# Patient Record
(demographics unavailable — no encounter records)

---

## 2020-04-09 NOTE — NUR
Patient resting in bed at this time, Dr Foster here to see patient and stated he is currently 
to sleepy to discuss information at this time. Patient received Ativan prior to arrival to 
unit.

 Per ED patient was urinating in sink, confused and anxious. Per MELODY Jordan during arrival to 
unit, Patient was cooperative with care and able to communicate nurses answers 
appropriately.

## 2020-04-09 NOTE — RAD
CHEST AP ONLY

 

Clinical indications: Shortness of breath. 

 

COMPARISON: February 29, 2016.

 

Findings: No acute lung infiltrate or pleural effusion or pulmonary edema 

or lung mass or pneumothorax is seen. Hiatal hernia is again evident. The 

heart size, pulmonary vasculature, mediastinum and both luisito are stable 

otherwise given rotation. 

 

Impression: No acute radiographic abnormality is seen.

 

Electronically signed by: Jose D Hernandez MD (4/9/2020 9:51 AM) [id number]

## 2020-04-09 NOTE — NUR
The patient, GURU PUTNAM, 72 y/o, M admitted by MANI BUSBY MD, was given written 
information regarding hospital policies, unit procedures and contact persons.  



Valuables were checked and left in room. Patient oriented to unit and call light system. Dr busby notified of arrival and of CTA. Remains on Fluid bolus on arrival to unit per sepsis 
protocol. Will obtain vitals per protocol

## 2020-04-09 NOTE — PHYS DOC
Past History


Past Medical History:  Asthma, GERD, Other


Additional Past Medical Histor:  LOW TESTERONE


Past Surgical History:  Other


Additional Past Surgical Histo:  WRIST SURGERY


Alcohol Use:  None





General Adult


EDM:


Chief Complaint:  FEVER





HPI:


HPI:


71-year-old male presents with fever.  The patient had a prostate biopsy as an 

outpatient yesterday.  About midnight, the patient woke up with chills and 

thought he might of a fever.  He measured a fever of 101.  He did not take any 

antipyretics.  When he woke up this morning, he still was feeling chilled and 

had a runny nose.  He is breathing faster than normal, but denies shortness of 

breath.  He does not have a cough.  His temperature on arrival was 99.  The 

patient did take prophylactic Levaquin before and after the procedure.  He 

denies any significant rectal pain, discharge or bleeding.  He has no known 

COVID 19 exposures or risk factors.  He has been out of the house, going to the 

store a few times.





Review of Systems:


Review of Systems:


Constitutional:  Fever and chills


Eyes:  Denies change in visual acuity 


HENT:  Runny nose


Respiratory:  Denies cough or shortness of breath 


Cardiovascular:  Denies chest pain or edema 


GI:  Nausea. Denies abdominal pain, vomiting, bloody stools or diarrhea 


:  Denies dysuria 


Musculoskeletal:  Denies back pain or joint pain 


Integument:  Denies rash 


Neurologic:  Denies headache, focal weakness or sensory changes 


Endocrine:  Denies polyuria or polydipsia 


Lymphatic:  Denies swollen glands 


Psychiatric:  Denies depression or anxiety





Heart Score:


Risk Factors:


Risk Factors:  DM, Current or recent (<one month) smoker, HTN, HLP, family 

history of CAD, obesity.


Risk Scores:


Score 0 - 3:  2.5% MACE over next 6 weeks - Discharge Home


Score 4 - 6:  20.3% MACE over next 6 weeks - Admit for Clinical Observation


Score 7 - 10:  72.7% MACE over next 6 weeks - Early Invasive Strategies





Current Medications:


Current Meds:





Current Medications








 Medications


  (Trade)  Dose


 Ordered  Sig/Rachel  Start Time


 Stop Time Status Last Admin


Dose Admin


 


 Ondansetron HCl


  (Zofran)  4 mg  STK-MED ONCE  4/9/20 09:26


 4/9/20 09:26 DC  





 


 Sodium Chloride  1,000 ml @ 


 1,000 mls/hr  1X  ONCE  4/9/20 09:15


 4/9/20 10:14   














Allergies:


Allergies:





Allergies








Coded Allergies Type Severity Reaction Last Updated Verified


 


  No Known Drug Allergies    4/9/20 No











Physical Exam:


PE:





Constitutional: Well developed, well nourished, no acute distress, non-toxic 

appearance. []


HENT: Normocephalic, atraumatic, bilateral external ears normal, oropharynx 

moist, no oral exudates, nose normal. []


Eyes: PERRLA, EOMI, conjunctiva normal, no discharge. [] 


Neck: Normal range of motion, no tenderness, supple, no stridor. [] 


Cardiovascular:Heart rate 107, regular rhythm, no murmur []


Lungs & Thorax:  breathing 25x a minute, regular, without difficulty. Bilateral 

breath sounds clear to auscultation []


Abdomen: Bowel sounds normal, soft, no tenderness, no masses, no pulsatile 

masses. [] 


Skin: Warm, dry, no erythema, no rash. [] 


Back: No tenderness, no CVA tenderness. [] 


Extremities: No tenderness, no cyanosis, no clubbing, ROM intact, no edema. [] 


Neurologic: Alert and oriented X 3, normal motor function, normal sensory 

function, no focal deficits noted. []


Psychologic: Affect normal, judgement normal, mood normal. []





Current Patient Data:


Vital Signs:





                                   Vital Signs








  Date Time  Temp Pulse Resp B/P (MAP) Pulse Ox O2 Delivery O2 Flow Rate FiO2


 


4/9/20 09:03 99.8 95 18 151/81 (104) 95 Room Air  











EKG:


EKG:


[]





Radiology/Procedures:


Radiology/Procedures:


[]


Impressions:


CHEST AP ONLY


 


Clinical indications: Shortness of breath. 


 


COMPARISON: February 29, 2016.


 


Findings: No acute lung infiltrate or pleural effusion or pulmonary edema 


or lung mass or pneumothorax is seen. Hiatal hernia is again evident. The 


heart size, pulmonary vasculature, mediastinum and both luisito are stable 


otherwise given rotation. 


 


Impression: No acute radiographic abnormality is seen.


 


Electronically signed by: Padmini Hernandez MD (4/9/2020 9:51 AM) [id number]














DICTATED AND SIGNED BY:     PADMINI HERNANDEZ MD


DATE:     04/09/20 0951





CC: REAGAN MORRISSEY DO; LEESA MARTINES MD ~





Course & Med Decision Making:


Course & Med Decision Making


Pertinent Labs and Imaging studies reviewed. (See chart for details)


The patient's labs are unremarkable except for slightly elevated creatinine 1.5.

  The only comparison is a few years ago at 1.3.  The patient does have a high 

respiratory rate.  We have given him a liter normal saline.  His heart rate has 

increased despite the fluids.  A second recheck of his temperature was still nor

mal.  He was tested for coronavirus out of an abundance of caution as we are 

unsure why he had a fever last night and abnormal vitals today.  The patient's 

heart rate continued to increase and the patient seemed more altered.  We 

checked his temperature a third time and found him to have a temperature 100.5. 

 Patient now meets sepsis criteria.  I spoke with Dr. Negrete, his urologist to 

perform the procedure yesterday and he is concerned that the patient could 

develop an infection from the procedure.  He has recommended Rocephin and 

admission.  I spoke with Dr. Foster and he has accepted the patient for admission 

to the ICU.  Blood cultures, lactic acid, 1 g of Rocephin, and the rest of his 

30 mL/kg of actual weight fluids have been ordered.  I believe he is low risk 

for coronavirus and this is most likely urosepsis.  I feel the full fluid bolus 

is justified at this time.  Patient also got a gram of Tylenol.





51 minutes of critical care time was spent on this patient exclusive of other 

billable procedures.


[]





Dragon Disclaimer:


Dragon Disclaimer:


This electronic medical record was generated, in whole or in part, using a voice

 recognition dictation system.





Departure


Departure:


Impression:  


   Primary Impression:  


   Sepsis


   Qualified Codes:  A41.9 - Sepsis, unspecified organism


Disposition:  09 ADMITTED AS INPATIENT


Admitting Physician:  Jamee Foster


Condition:  GUARDED


Referrals:  


LEESA MARTINES MD (PCP)





Sepsis Assessment:


Date and Time of Assessment


Date:  Apr 9, 2020


Time:  10:41





Vital Signs


Vital Signs





Vital Signs








  Date Time  Temp Pulse Resp B/P (MAP) Pulse Ox O2 Delivery O2 Flow Rate FiO2


 


4/9/20 11:41 100.5       


 


4/9/20 11:32  121 48 132/73 (92) 95 Room Air  











Respirations


Respiratory Effort:  Normal


Respiratory Pattern:  Normal





Cardiovascular


Pulse Rhythm:  Regular


HEART:  No murmurs noted





Lung Sounds


Breath Sounds:  Clear





Capillary Refill


Capillary Refill:  Rt Hand < 3 seconds





Peripheral Pulse


Pulse Location:  Monitor


Pulse Strength:  Normal (2+)


Pulse Assessment Method:  Monitor





Integumentary


Skin:  Warm


Skin Moisture:  Dry


Skin Turgor:  Normal


Skin Color:  warm


Fingernail Color:  WNL





Sepsis Assessment:


Date and Time of Assessment


Date:  Apr 9, 2020


Time:  12:02





Vital Signs


Vital Signs





Vital Signs








  Date Time  Temp Pulse Resp B/P (MAP) Pulse Ox O2 Delivery O2 Flow Rate FiO2


 


4/9/20 11:41 100.5       


 


4/9/20 11:32  121 48 132/73 (92) 95 Room Air  











Respirations


Respiratory Effort:  Shortness of air


Respiratory Pattern:  Normal





Cardiovascular


Pulse Rhythm:  Regular


HEART:  No murmurs noted





Lung Sounds


Breath Sounds:  Clear





Capillary Refill


Capillary Refill:  Rt Hand < 3 seconds





Peripheral Pulse


Pulse Location:  Monitor


Pulse Strength:  Normal (2+)


Pulse Assessment Method:  Monitor





Integumentary


Skin:  Warm


Skin Moisture:  Dry


Skin Turgor:  Normal


Skin Color:  warm


Fingernail Color:  WNL











REAGAN MORRISSEY DO                  Apr 9, 2020 09:44

## 2020-04-09 NOTE — NUR
Patient blood pressure decreased 80/50s, patient stable and appears to not be in any 
distress. Patient O2 low 90s, placed on 2l NC and increased to 95%. Notified Dr Foster of 
patients blood pressure, stated patient in finishing 2753 fluid bolus and has 753 left. 
Orders to give 1L NS bolus and change patient to zosyn, dc rocephin and place 3 way crowell 
cath with CBI due to blood tinged urine.

## 2020-04-09 NOTE — RAD
CT ABD PELV W/ IV CONTRST ONLY

 

History: Fever. Recent prostate biopsy.

 

Technique:  After the administration of intravenous contrast, CT imaging 

was performed of the abdomen and pelvis.  Multiplanar images are reviewed.

 

 

Exposure: One or more of the following individualized dose reduction 

techniques were utilized for this examination:  

1. Automated exposure control  

2. Adjustment of the mA and/or kV according to patient size  

3. Use of iterative reconstruction technique.

 

Comparison:  None

 

Findings: Evaluation degraded by respiratory motion.

Lower chest: No consolidation or pleural effusion. Moderate hiatal hernia.

Of fluid within the distal esophagus.

 

Abdomen and pelvis: Right hepatic lobe cyst measures 6.7 cm. Additional 

tiny left hepatic lobe hypodensity, too small to further characterize. The

spleen, adrenal glands, pancreas and gallbladder are unremarkable. No 

biliary ductal dilatation.

 

Mild nonspecific perinephric stranding. No hydronephrosis. No intrarenal 

calculi. Mild urinary bladder wall thickening. Enlarged prostate with 

calcifications tissues up to 6.6 x 5.2 cm. There is inflammatory changes 

adjacent to the prostate within the pelvis.

 

Normal appendix. No evidence of bowel obstruction. No pathologic 

lymphadenopathy. No ascites.

 

Bones: No pathologic osseous lesions.

 

Impression:

1.  Enlarged prostate with adjacent inflammatory changes, may indicate 

prostatitis.

2.  Mild urinary bladder wall thickening, may relate to chronic outlet 

obstruction. Correlate for cystitis.

3.  Mild nonspecific bilateral perinephric stranding, may relate to 

patient's age. Correlate for infection.

4.  Hiatal hernia with fluid in the distal esophagus likely related to 

reflux.

 

 

Electronically signed by: Torres Floyd DO (4/9/2020 12:15 PM) [id number]

## 2020-04-09 NOTE — NUR
Orders also for PRN Levophed if fluid bolus does not help patients BP. Patient Vitals stable 
at this time. Patient sitting up eating supper at this time and is asking for his phone.

## 2020-04-09 NOTE — HP
ADMIT DATE:  04/09/2020



HISTORY OF PRESENT ILLNESS:  The patient is a 71-year-old  patient who

presented to the Emergency Room with complaint of fever.  He apparently had a

prostate biopsy as an outpatient yesterday.  About midnight, the patient woke up

with chills and he thought he might have a fever.  He measured, his temperature

was 101.  He did not take any antibiotics.  When he woke up this morning, he

still was feeling chilled and had a runny nose.  He is breathing faster than

normal, but denies any shortness of breath.  He does not have any cough.  His

temperature on arrival was 99.  He had prophylactic Levaquin before and after

the procedure.  He denies any significant rectal pain, discharge or bleeding. 

He has no known COVID-19 exposure risk factors, has been out of the house going

to store a few times.  He was extensively investigated in the Emergency Room. 

His chest x-ray showed no acute radiographic abnormalities seen.  Has had lab

work done, showed his white cell count was normal.  He has thrombocytopenia. 

His chemistry showed that he has had impaired kidney function, has also lactic

acidosis and his urinalysis showed too numerous to count rbc's, as well as 5-10

wbc's and few bacteria.  Apparently, the ER physician spoke with Dr. Griffiths, his

urologist who performed a biopsy yesterday and he was concerned that the patient

could develop an infection from the procedure and has recommended Rocephin on

admission and therefore, the patient was admitted with sepsis, with lactic

acidosis, likely due to urinary tract infection.  He was also started on 30 mL

____ fluid ordered.  Unfortunately, he apparently was given 1 mg of Ativan prior

to transfer to the ICU, and I could not really get much information from him. 

However, was more awake when the nursing staff asked him and apparently he is

known to have bronchial asthma, gastroesophageal reflux disease, hypogonadism,

and benign prostatic hypertrophy.



PAST SURGICAL HISTORY:  Significant for carpal tunnel syndrome as well as recent

prostate biopsy.



ALLERGIES:  He has no known drug allergies.



MEDICATIONS:  He is currently on albuterol sulfate 2 puffs every 4-6 hours,

lansoprazole 30 mg daily and testosterone cypionate 200 mg 1 mL vial every 2

weeks.



FAMILY HISTORY:  Unremarkable.



SOCIAL HISTORY:  He is , has grown up children.  He has never smoked,

does not drink alcohol or use any recreational drugs.



PHYSICAL EXAMINATION:

GENERAL:  On arrival to the Emergency Room, he was well-developed,

well-nourished, in no acute distress.  There was no pallor, jaundice, cyanosis

or thyromegaly.  No jugular venous distention or limb edema.

VITAL SIGNS:  His heart rate was 95, blood pressure 151/81, temperature was

99.8, respiratory rate was 18 and oxygen saturation was 95%.

HEAD, EYES, EARS, NOSE AND THROAT:  Normocephalic, atraumatic.

NECK:  Supple.

HEART:  Showed normal first and second heart sounds.  No gallop or murmur.

CHEST:  Clear to auscultation.  No crepitation or rhonchi.

ABDOMEN:  Distended, soft, nontender.  No guarding or rigidity.  No

organomegaly.  All hernial orifice intact.  Bowel sounds normal.

NEUROLOGIC:  He was very lethargic as well as given 1 mg of Ativan; however, all

his cranial nerves seem to be grossly intact.

EXTREMITIES:  He moves extremities without difficulty.



LABORATORY DATA:  His lab work on arrival to the Emergency Room showed a white

cell count 4500, hemoglobin 14, hematocrit 42, MCV 96, and platelet count

257,000 with manual differential showed 97% polymorphs, 2% lymphocytes.  His

chemistry showed a serum sodium 139, potassium 4.4, chloride 105, bicarbonate

27, anion gap of 7, BUN 22, creatinine 1.5, estimated GFR was 56 mL per minute. 

His glucose 165.  Lactic acid was 3.2, calcium was 8.  Total bilirubin, AST,

ALT, alkaline phosphatase were normal.  Total protein 6, albumin was 3.2. 

Urinalysis showed the urine was red with too numerous to count rbc's, 5-10

wbc's, very few bacteria.



His chest x-ray showed no acute lung infiltrate or pleural effusion, pulmonary

edema or lung mass or pneumothorax is seen.  Hiatal hernia is again evident. 

The heart size, pulmonary vasculature, mediastinum and both luisito are stable,

otherwise ____.  We did a CT scan of the abdomen and pelvis with IV contrast,

which basically showed that there is enlarged prostate with adjacent

inflammatory changes and indicate prostatitis.



IMPRESSION AND PLAN:

1.  Mild urinary bladder wall thickening, may relate to chronic outlet

obstruction.  He has also mild nonspecific bilateral perinephric stranding, may

relate to the patient's age and hiatal hernia with fluid in the distal

esophagus, likely related to reflux.  The patient was admitted with diagnosis of

urosepsis due to urinary tract infection with lactic acidosis,  After obtaining

the appropriate cultures, he was started on IV Rocephin together with IV fluid. 

Other medical problems include Bronchial asthma.

2.  Gastroesophageal reflux disease, hepatitis C, and hypogonadism.

3.  Benign prostatic hypertrophy.  I will reconcile all his medication.  We will

continue with IV fluid and also IV Rocephin.





______________________________

MANI BUSBY MD



DR:  BHANU/semaj  JOB#:  [id number] / [id number]

DD:  04/09/2020 13:46  DT:  04/09/2020 14:55

## 2020-04-10 NOTE — NUR
CNA in room and stated patient vomited, nurse saw patients HR in the 150s. Stat EKG placed 
by nursing supervisor. Patient states he can feel some fluttering, denies SOA or dyspnea. 
 notified to draw stat labs. Dr BUSBY notified of patients condition.

## 2020-04-10 NOTE — NUR
Pt calm and cooperative with adls and care, denies complaints of pain or discomfort. Dr Lagos 
called to check on patient today, states that biopsy results will be in next week. 

Pt without temp higher then 99.1 this shift, BP remains stable so far this shift. Continues 
to have 3-way Mandujano with straw urine which is improved from yesterday with PRN bladder 
irrigation. Continues on IV fluids and zosyn for urosepsis, negative for COVID 19. Awaiting 
for Dr Foster to see patient, may plan to transfer patient to med surg floor.

## 2020-04-10 NOTE — NUR
Report given to Dahlia at this time, Patient remains on Cardizem gtt. Vitals stable and 
patient resting in bed at this time sleeping. 

HR 90s-110s and blood pressure stable.

## 2020-04-10 NOTE — NUR
Pt calm and cooperative with assessment and cares. Pt without temp higher then 99.7 this 
shift. HR improved, down to 70-80's when sleeping. Pt denies pain. 3-way Mandujano with cherry 
to tanya urine improved with PRN bladder irrigation. BP maintained above MAP greater then 
65, no need for Levophed gtt this shift. Pt assisted up to toilet for BM this AM, no blood 
noted. Pt slept fair during night.

## 2020-04-10 NOTE — PN
DATE:  04/10/2020



SUBJECTIVE:  The patient is resting, slightly propped up in bed, in no apparent

distress.  He is awake, alert, responding appropriately.  On questioning him, he

denied any complaint.  The nursing staff stated that he had an uneventful night.



PHYSICAL EXAMINATION:

GENERAL:  When I examined him, he looked somewhat pale.  No jaundice, cyanosis

or thyromegaly.  No jugular venous distention.  No lower limb edema.

VITAL SIGNS:  His heart rate was 96, blood pressure was 132/78, temperature was

98.1, respiratory rate was 35, and oxygen saturation was 96% on 2 liters of

oxygen.

HEAD, EYES, EARS, NOSE AND THROAT:  Showed normocephalic, atraumatic.

NECK:  Supple.

HEART:  Showed normal first and second heart sounds.  No gallop, rub or murmur.

CHEST:  Clear to auscultation.  No crepitation or rhonchi.

ABDOMEN:  Distended, soft, nontender.  No guarding or rigidity.  No

organomegaly.  All hernial orifice intact.  Bowel sounds normal.

NEUROLOGIC:  He was awake, alert, responding appropriately.  All cranial nerves

are intact.  He moves extremities without difficulty.



His intake over the last 24 hours was 7820, output was 3250.



LABORATORY DATA:  Showed a white cell count of 12,600, hemoglobin 13, hematocrit

40, MCV 96, and platelet count of 75,000.  His serum sodium was 141, potassium

4.6, chloride 110, bicarbonate 21, anion gap of 10, BUN 34, creatinine 2.3,

estimated GFR was 28 mL per minute, his glucose 147, and calcium was 7.2.  Total

bilirubin is normal.  AST slightly elevated.  ALT and  alkaline phosphatase

normal.  Total protein 5.1, albumin was 2.4.  Urinalysis showed too numerous to

count rbc's, 5-10 wbc's, and very few bacteria.  Apparently, his COVID,

SARS-coronavirus-2 RNA was negative.  His blood cultures are negative.



ASSESSMENT:

1.  Sepsis due to urinary tract infection following a prostate biopsy with

lactic acidosis.  Unfortunately, his lab works are actually worsening.  He has

now acute on chronic kidney injury.  His creatinine is up to 2.3.  He has also

leukocytosis and marked thrombocytopenia; however, his SARS-coronavirus-2 RNA

and SARS RNA are both negative.



PLAN:  My plan is to continue with IV fluid and continue with IV.  I will cut

down the piperacillin and goes to continue to monitor his lab work and transfer

him to the floor.





______________________________

MANI BUSBY MD



DR:  BHANU/semaj  JOB#:  [id number] / [id number]

DD:  04/10/2020 16:52  DT:  04/10/2020 19:34

## 2020-04-10 NOTE — NUR
Dig 500 given, EKG confirms AFIB RVR. Patient placed on cardizem gtt with 20mg bolus. 
Patient tolerated and vitals remain stable.

## 2020-04-10 NOTE — NUR
Dr Foster notified of Elevated troponin and labs, no orders at this time. Mag ordered per 
protocol. Cardiology consulted and notified of patients condition.

## 2020-04-10 NOTE — EKG
[facility] [address], [place] [address]

Test Date:    2020-04-10               Test Time:    17:58:08

Pat Name:     GURU PUTNAM              Department:   

Patient ID:   [id number]           Room:         ICU02 1

Gender:       M                        Technician:   

:          [id number]               Requested By: MANI BUSBY

Order Number: [id number]            Reading MD:   Arun Avina MD

                                 Measurements

Intervals                              Axis          

Rate:         138                      P:            

IA:                                    QRS:          -21

QRSD:         84                       T:            24

QT:           334                                    

QTc:          514                                    

                           Interpretive Statements

ATRIAL FIBRILLATION WITH RVR

NON-SPECIFIC ST/T CHANGES

Electronically Signed On 2020 9:38:42 CDT by Arun Avina MD

## 2020-04-11 NOTE — PDOC2
CARDIAC CONSULT


DATE OF CONSULT


Date Of Consult


DATE: 4/11/20 


TIME: 11:36





REASON FOR CONSULT


Reason for Consult


new onset atrial fibrillation





REFERRING PHYSICIAN


Referring Physician


Dr. Foster





SOURCE


Source:  Chart review, Patient





HPI


History of Present Illness


The patient is a 71-year-old male who was reviewed admitted through the 

emergency room on the ninth secondary to a fever.  On the day prior to admission

the patient had had a prostate biopsy and there was concern of a possible 

infection status post biopsy.  The patient was treated with antibiotics and IV 

fluids and gradually improved.  He has an underlying history of asthma and 

gastroesophageal reflux disease.  Later last night the patient went into atrial 

fibrillation with a rapid ventricular response rate.  He was initially treated 

with 1 dose of IV digoxin and placed on a Cardizem drip and heparin.  This 

controlled his rate and this morning at approximately 8:00 the patient is in a 

sinus rhythm.  He has no known history of cardiac arrhythmias, heart failure or 

coronary artery disease.





PAST MEDICAL HISTORY


Cardiovascular:  HTN


Pulmonary:  Asthma


GI:  GERD





PAST SURGICAL HISTORY


Past Surgical History:  Other (Recent prostate biopsy and previous carpal tunnel

surgery)





FAMILY HISTORY


Family History:  No Significant





SOCIAL HISTORY


Smoke:  No


ALCOHOL:  none





CURRENT MEDICATIONS


Current Medications





Current Medications


Sodium Chloride 1,000 ml @  1,000 mls/hr 1X  ONCE IV  Last administered on 

4/9/20at 09:00;  Start 4/9/20 at 09:15;  Stop 4/9/20 at 10:14;  Status DC


Ondansetron HCl (Zofran) 4 mg 1X  ONCE IVP  Last administered on 4/9/20at 09:45;

 Start 4/9/20 at 09:30;  Stop 4/9/20 at 09:31;  Status DC


Ondansetron HCl (Zofran) 4 mg STK-MED ONCE .ROUTE ;  Start 4/9/20 at 09:26;  

Stop 4/9/20 at 09:26;  Status DC


Prochlorperazine Edisylate (Compazine) 10 mg 1X  ONCE IV  Last administered on 

4/9/20at 10:51;  Start 4/9/20 at 10:45;  Stop 4/9/20 at 10:46;  Status DC


Prochlorperazine Edisylate (Compazine) 10 mg STK-MED ONCE .ROUTE ;  Start 4/9/20

at 10:41;  Stop 4/9/20 at 10:42;  Status DC


Ceftriaxone Sodium 1 gm/ Sodium Chloride 50 ml @  100 mls/hr 1X  ONCE IV  Last 

administered on 4/9/20at 11:32;  Start 4/9/20 at 11:30;  Stop 4/9/20 at 11:59;  

Status DC


Iohexol (Omnipaque 300 Mg/ml) 75 ml 1X  ONCE IV  Last administered on 4/9/20at 

11:57;  Start 4/9/20 at 11:30;  Stop 4/9/20 at 11:31;  Status DC


Sodium Chloride 50 ml @ As Directed STK-MED ONCE .ROUTE ;  Start 4/9/20 at 

11:26;  Stop 4/9/20 at 11:26;  Status DC


Ceftriaxone Sodium (Rocephin) 1 gm STK-MED ONCE .ROUTE ;  Start 4/9/20 at 11:26;

 Stop 4/9/20 at 11:26;  Status DC


Acetaminophen (Tylenol) 500 mg STK-MED ONCE PO ;  Start 4/9/20 at 11:56;  Stop 

4/9/20 at 11:57;  Status DC


Acetaminophen (Tylenol) 1,000 mg 1X  ONCE PO  Last administered on 4/9/20at 

12:00;  Start 4/9/20 at 12:00;  Stop 4/9/20 at 12:06;  Status DC


Sodium Chloride 2,730 ml @  2,730 mls/hr Q1H IV  Last administered on 4/9/20at 

12:15;  Start 4/9/20 at 12:15;  Stop 4/9/20 at 12:46;  Status DC


Lorazepam (Ativan Inj) 1 mg 1X  ONCE IVP  Last administered on 4/9/20at 12:28;  

Start 4/9/20 at 12:30;  Stop 4/9/20 at 12:35;  Status DC


Albuterol Sulfate (Ventolin) 8.5 mg Q4H  PRN IH wheezing Last administered on 

4/10/20at 14:12;  Start 4/9/20 at 13:45


Non-Formulary Medication (Lansoprazole (Prevacid)) 1 cap DAILY PO ;  Start 

4/10/20 at 09:00;  Stop 4/9/20 at 13:52;  Status DC


Non-Formulary Medication (Testosterone Cypionate ) 1 ml Q2WKS IM ;  Start 

4/23/20 at 09:00;  Status UNV


Ceftriaxone Sodium 1 gm/ Sodium Chloride 50 ml @  100 mls/hr Q24H IV ;  Start 

4/9/20 at 14:00;  Stop 4/9/20 at 16:43;  Status DC


Acetaminophen (Tylenol) 650 mg Q4H  PRN PO PAIN Last administered on 4/11/20at 

05:19;  Start 4/9/20 at 14:00


Pantoprazole Sodium (Protonix) 40 mg DAILYAC PO  Last administered on 4/11/20at 

08:17;  Start 4/10/20 at 07:30


Sodium Chloride 1,000 ml @  125 mls/hr Q8H IV  Last administered on 4/11/20at 05

:52;  Start 4/9/20 at 14:00


Piperacillin Sod/ Tazobactam Sod 3.375 gm/Sodium Chloride 50 ml @  100 mls/hr 

Q8HRS IV  Last administered on 4/10/20at 13:44;  Start 4/9/20 at 18:00;  Stop 

4/10/20 at 17:10;  Status DC


Sodium Chloride 1,000 ml @  1,000 mls/hr 1X  ONCE IV  Last administered on 

4/9/20at 17:09;  Start 4/9/20 at 16:45;  Stop 4/9/20 at 17:44;  Status DC


Norepinephrine Bitartrate 8 mg/ Dextrose 258 ml @  17.144 mls/ hr CONT  PRN IV 

SEE I/O RECORD;  Start 4/9/20 at 16:45


Piperacillin Sod/ Tazobactam Sod 2.25 gm/Sodium Chloride 50 ml @  100 mls/hr 

Q8HRS IV  Last administered on 4/11/20at 05:52;  Start 4/10/20 at 22:00


Lactobacillus Rhamnosus (Culturelle) 1 cap BID PO  Last administered on 

4/11/20at 08:17;  Start 4/10/20 at 21:00


Digoxin (Lanoxin) 500 mcg 1X  ONCE IV  Last administered on 4/10/20at 17:58;  

Start 4/10/20 at 18:00;  Stop 4/10/20 at 18:01;  Status DC


Diltiazem HCl 125 mg/Sodium Chloride 125 ml @ 5 mls/hr CONT  PRN IV SEE I/O 

RECORD Last administered on 4/11/20at 01:17;  Start 4/10/20 at 18:00


Diltiazem HCl (Cardizem Iv Push) 10 mg 1X  ONCE IVP ;  Start 4/10/20 at 18:00;  

Stop 4/10/20 at 18:01;  Status Cancel


Diltiazem HCl (Cardizem Iv Push) 10 mg 1X  ONCE IVP  Last administered on 

4/10/20at 18:15;  Start 4/10/20 at 18:15;  Stop 4/10/20 at 18:16;  Status DC


Magnesium Sulfate 50 ml @ 25 mls/hr 1X  ONCE IV  Last administered on 4/10/20at 

19:50;  Start 4/10/20 at 18:45;  Stop 4/10/20 at 20:44;  Status DC





Active Scripts


Active


Reported


Testosterone Cypionate 200 Mg/1 Ml Vial 1 Ml IM Q2WKS


Proair Hfa Inhaler (Albuterol Sulfate) 8.5 Gm Hfa.aer.ad 2 Puff IH PRN Q4-6HRS 

PRN 21 Days


Prevacid (Lansoprazole) 30 Mg Capsule.dr 1 Cap PO DAILY 30 Days





ALLERGIES


Allergies:  


Coded Allergies:  


     No Known Drug Allergies (Unverified , 4/9/20)





ROS


General:  YES: Chills, Fatigue





PHYSICAL EXAM


Physical Exam


Deferred.





VITALS


Vital Signs





Vital Signs








  Date Time  Temp Pulse Resp B/P (MAP) Pulse Ox O2 Delivery O2 Flow Rate FiO2


 


4/11/20 09:00      Room Air  


 


4/11/20 08:00 97.4 78 22 132/77 (95) 96   


 


4/10/20 16:02       2.0 











LABS


LABS





Laboratory Tests








Test


 4/9/20


11:46 4/9/20


15:41 4/10/20


03:45 4/10/20


18:03


 


Urine Collection Type Unknown    


 


Urine Color Red    


 


Urine Clarity Turbid    


 


Urine pH     


 


Urine Specific Gravity     


 


Urine Protein  (NEG-TRACE)    


 


Urine Glucose (UA)  mg/dL (NEG)    


 


Urine Ketones (Stick)  mg/dL (NEG)    


 


Urine Blood  (NEG)    


 


Urine Nitrite  (NEG)    


 


Urine Bilirubin  (NEG)    


 


Urine Urobilinogen Dipstick


 mg/dL (0.2


mg/dL) 


 


 





 


Urine Leukocyte Esterase  (NEG)    


 


Urine RBC


 Tntc /HPF


(0-2) 


 


 





 


Urine WBC


 5-10 /HPF


(0-4) 


 


 





 


Urine Squamous Epithelial


Cells Few /LPF 


 


 


 





 


Urine Bacteria


 Few /HPF


(0-FEW) 


 


 





 


Urine Mucus Mod /LPF    


 


Lactic Acid Level


 


 3.2 mmol/L


(0.4-2.0) 


 





 


White Blood Count


 


 


 12.6 x10^3/uL


(4.0-11.0) 12.6 x10^3/uL


(4.0-11.0)


 


Red Blood Count


 


 


 4.22 x10^6/uL


(4.30-5.70) 4.21 x10^6/uL


(4.30-5.70)


 


Hemoglobin


 


 


 13.0 g/dL


(13.0-17.5) 13.1 g/dL


(13.0-17.5)


 


Hematocrit


 


 


 40.3 %


(39.0-53.0) 40.1 %


(39.0-53.0)


 


Mean Corpuscular Volume   96 fL ()  95 fL () 


 


Mean Corpuscular Hemoglobin   31 pg (25-35)  31 pg (25-35) 


 


Mean Corpuscular Hemoglobin


Concent 


 


 32 g/dL


(31-37) 33 g/dL


(31-37)


 


Red Cell Distribution Width


 


 


 15.1 %


(11.5-14.5) 14.8 %


(11.5-14.5)


 


Platelet Count


 


 


 75 x10^3/uL


(140-400) 55 x10^3/uL


(140-400)


 


Sodium Level


 


 


 141 mmol/L


(136-145) 





 


Potassium Level


 


 


 4.6 mmol/L


(3.5-5.1) 





 


Chloride Level


 


 


 110 mmol/L


() 





 


Carbon Dioxide Level


 


 


 21 mmol/L


(21-32) 





 


Anion Gap   10 (6-14)  


 


Blood Urea Nitrogen


 


 


 34 mg/dL


(8-26) 





 


Creatinine


 


 


 2.3 mg/dL


(0.7-1.3) 





 


Estimated GFR


(Cockcroft-Gault) 


 


 28.2 


 





 


BUN/Creatinine Ratio   15 (6-20)  


 


Glucose Level


 


 


 147 mg/dL


(70-99) 





 


Calcium Level


 


 


 7.2 mg/dL


(8.5-10.1) 





 


Total Bilirubin


 


 


 0.6 mg/dL


(0.2-1.0) 





 


Aspartate Amino Transf


(AST/SGOT) 


 


 69 U/L (15-37) 


 





 


Alanine Aminotransferase


(ALT/SGPT) 


 


 50 U/L (16-63) 


 





 


Alkaline Phosphatase


 


 


 44 U/L


() 





 


Total Protein


 


 


 5.1 g/dL


(6.4-8.2) 





 


Albumin


 


 


 2.4 g/dL


(3.4-5.0) 





 


Albumin/Globulin Ratio   0.9 (1.0-1.7)  


 


Test


 4/10/20


18:04 4/11/20


05:45 


 





 


Sodium Level


 139 mmol/L


(136-145) 142 mmol/L


(136-145) 


 





 


Potassium Level


 4.0 mmol/L


(3.5-5.1) 4.1 mmol/L


(3.5-5.1) 


 





 


Chloride Level


 108 mmol/L


() 112 mmol/L


() 


 





 


Carbon Dioxide Level


 21 mmol/L


(21-32) 22 mmol/L


(21-32) 


 





 


Anion Gap 10 (6-14)  8 (6-14)   


 


Blood Urea Nitrogen


 37 mg/dL


(8-26) 29 mg/dL


(8-26) 


 





 


Creatinine


 1.7 mg/dL


(0.7-1.3) 1.1 mg/dL


(0.7-1.3) 


 





 


Estimated GFR


(Cockcroft-Gault) 39.9 


 66.0 


 


 





 


Glucose Level


 184 mg/dL


(70-99) 111 mg/dL


(70-99) 


 





 


Calcium Level


 7.6 mg/dL


(8.5-10.1) 7.4 mg/dL


(8.5-10.1) 


 





 


Magnesium Level


 1.7 mg/dL


(1.8-2.4) 2.2 mg/dL


(1.8-2.4) 


 





 


Troponin I Quantitative


 0.213 ng/mL


(0-0.055) 


 


 





 


White Blood Count


 


 11.9 x10^3/uL


(4.0-11.0) 


 





 


Red Blood Count


 


 4.05 x10^6/uL


(4.30-5.70) 


 





 


Hemoglobin


 


 12.3 g/dL


(13.0-17.5) 


 





 


Hematocrit


 


 38.5 %


(39.0-53.0) 


 





 


Mean Corpuscular Volume  95 fL ()   


 


Mean Corpuscular Hemoglobin  31 pg (25-35)   


 


Mean Corpuscular Hemoglobin


Concent 


 32 g/dL


(31-37) 


 





 


Red Cell Distribution Width


 


 14.9 %


(11.5-14.5) 


 





 


Platelet Count


 


 59 x10^3/uL


(140-400) 


 





 


BUN/Creatinine Ratio  26 (6-20)   


 


Total Bilirubin


 


 0.4 mg/dL


(0.2-1.0) 


 





 


Aspartate Amino Transf


(AST/SGOT) 


 57 U/L (15-37) 


 


 





 


Alanine Aminotransferase


(ALT/SGPT) 


 42 U/L (16-63) 


 


 





 


Alkaline Phosphatase


 


 59 U/L


() 


 





 


Total Protein


 


 4.8 g/dL


(6.4-8.2) 


 





 


Albumin


 


 2.0 g/dL


(3.4-5.0) 


 





 


Albumin/Globulin Ratio  0.7 (1.0-1.7)   











IMAGES


IMAGES


Chest x-ray with no acute changes.





EKG


EKG


Monitor last night showed atrial fibrillation.  Patient is now in a sinus 

rhythm.





ASSESSMENT/PLAN


Assessment/Plan


1.  Urinary tract infection status post prostate biopsy.  Patient has been 

treated with fluids and antibiotics and is feeling better.  This was also 

reviewed by the patient's urologist.





2.  Fever as noted above.  History of asthma.  Patient has been tested for 

Covid.





3.  New onset of rapid atrial fibrillation.  As noted above the patient was 

placed on IV Cardizem and converted to sinus rhythm this morning.  We will 

continue an IV Cardizem for several more hours.  If remains in sinus rhythm with

then convert to oral Cardizem.  Also at this time would continue heparin at 

least overnight monitoring whether the patient resumes atrial fibrillation.  An 

outpatient monitor post discharge would be helpful to exclude paroxysmal atrial 

fibrillation although his presentation is consistent with atrial fibrillation 

precipitated by his underlying infection.





Thank you for allowing us to participate in the care of your patient.











DIMPLE CARDONA MD            Apr 11, 2020 11:43

## 2020-04-11 NOTE — RAD
PORTABLE CHEST 1V 

 

INDICATION: Dyspnea. 

 

COMPARISON STUDY: 4/9/2020.

 

FINDINGS:

 

Lungs: Normal lung volume. No consolidation. Normal pulmonary vasculature.

 

Pleura: No pleural effusion or pneumothorax.

 

Heart and Mediastinum: Stable cardiomediastinal silhouette and great 

vessels. Large hiatal hernia.

 

IMPRESSION:  

 

No consolidation.

 

Large hiatal hernia.

 

Electronically signed by: Juan Huddleston MD (4/11/2020 2:29 PM) [id number]

## 2020-04-11 NOTE — PN
DATE:  04/11/2020



SUBJECTIVE:  The patient is resting, slightly propped up, eating his lunch

comfortably, in no apparent distress.  On questioning him, he denied any

complaint.  He went into atrial fibrillation with rapid ventricular response

yesterday, treated initially with digoxin and eventually was given loading dose

of Cardizem and Cardizem drip and he is now converted back to sinus rhythm with

a  heart rate of 78, blood pressure was 132/77, temperature 97.4, respiratory

rate was 18 and oxygen saturation was 97%.



PHYSICAL EXAMINATION:

HEAD, EYES, EARS, NOSE AND THROAT:  Normocephalic, atraumatic.

NECK:  Supple.

HEART:  Showed normal first and second heart sounds.  No gallop or murmur.

CHEST:  Clear to auscultation.  No crepitation or rhonchi.

ABDOMEN:  Distended, soft, nontender.

NEUROLOGIC:  He is awake, alert, responding appropriately.  All cranial nerves

are intact.  He moves extremities without difficulty.



His intake over the last 24 hours was 7800, output was 3250.



LABORATORY DATA:  As of this morning, his white cell count is down to 11,900,

hemoglobin 12, hematocrit 38, MCV 95, and platelet count is slightly up at

59,000.  His chemistry showed a serum sodium 142, potassium 4.1, chloride 112,

bicarbonate 22, anion gap of 8, BUN 29, creatinine 1.1, estimated GFR was 66 mL

per minute, his glucose 111, calcium was 7.4, magnesium was 2.2.  Total

bilirubin, AST, ALT, alkaline phosphatase were normal.  Total protein was 4.8,

albumin 2.  His COVID-19 by PCR was negative.  His urine culture showed less

than 10,000 colony forming units per mL bacteria.  His blood cultures showed no

growth after 2 days.



ASSESSMENT:

1.  Sepsis due to urinary tract infection following a prostate biopsy with

lactic acidosis.

2.  Acute kidney injury.  Creatinine has risen up to 2.3 that has resolved.  His

creatinine today is 1.1

3.  Leukocytosis, improving.  Thrombocytopenia slowly improving.  It is

up-to-date from 55-59.



PLAN:  My plan is to continue with IV fluid, continue with IV antibiotic, we

will transfer the patient to the floor.  I will stop the continuous bladder

irrigation.  Add Flomax and hopefully he can be discharged home probably either

tomorrow or Monday after we get the result of the blood culture.





______________________________

MANI BUSBY MD



DR:  BHANU/[email or web address]  JOB#:  [id number] / [id number]

DD:  04/11/2020 12:51  DT:  04/11/2020 13:17

## 2020-04-11 NOTE — NUR
Pt. remained rate controlled in A.fib after 2030 throughout the rest of the night. Otherwise 
pt. remained afebrile with mild complaints related to urinary/ prostate pain with catheter 
and very slow CBI throughout the night.

## 2020-04-11 NOTE — NUR
CBI d/c per Dr. Diaz, pt complains of being SOB and tachypnea. Dr. diaz ordered stat chest 
Xray. Will CTM.

## 2020-04-12 NOTE — NUR
Pt alert, oriented, pleasant throughout shift; tolerable of all cares. Able to make needs 
known. Pt remained NSR throughout shift. Mandujano patent and draining well. Possible discharge 
tomorrow.

## 2020-04-12 NOTE — PN
DATE:  04/12/2020



SUBJECTIVE:  The patient is sitting comfortably in his chair, in no apparent

respiratory distress.  On questioning him, he denied any complaint, in

particular denied any chest pain, shortness of breath, cough, phlegm or

hemoptysis.



PHYSICAL EXAMINATION:

GENERAL:  When I saw him this morning, he looked somewhat pale, but no jaundice,

cyanosis or thyromegaly.  No jugular venous distention.  No limb edema.

VITAL SIGNS:  His heart rate was 85, blood pressure was 160/90, temperature was

97.9, respiratory rate was 20, and oxygen saturation was 96% on room air.

HEAD, EYES, EARS, NOSE AND THROAT:  Showed normocephalic, atraumatic.

NECK:  Supple.

HEART:  Showed normal first and second heart sounds.  No gallop or murmur.

CHEST:  Clear to auscultation.  No crepitation or rhonchi.

ABDOMEN:  Distended, soft, nontender.

NEUROLOGIC:  He was awake, alert, responding appropriately.  All cranial nerves

are intact.  He ambulates without assistance or assistive devices.



His intake over the last 24 hours was 2410, output was 3600.



LABORATORY DATA:  As of this morning, his white cell count was 12,500,

hemoglobin 12, hematocrit 36, MCV 94 and platelet count of 68,000.  His

chemistry showed a serum sodium 143, potassium 3.8, chloride 111, bicarbonate

25, anion gap of 7, BUN 21, creatinine 1, estimated GFR was 74 mL per minute,

her glucose 106, calcium was 7.5, magnesium was 2.2.  Total bilirubin, AST, ALT,

alkaline phosphatase were normal.  Total protein 5, albumin was 2.1.  So far,

his urine culture showed growth of less than 10,000 colony forming units of

bacteria and his blood cultures are so far negative.



ASSESSMENT:

1.  Sepsis due to urinary tract infection following prostate biopsy with lactic

acidosis, resolved.

2.  Acute kidney injury, improving, his creatinine came down from 2.3 to 1.

3.  Thrombocytopenia, slowly improving.  It is up to 68 from 59.

4.  Benign prostatic hypertrophy.  He has also hypertension for which he is on

diltiazem 180 mg and obviously atrial fibrillation.  I will discontinue the IV

fluid and we will decide on further management accordingly.





______________________________

MANI BUSBY MD



DR:  BHANU/semaj  JOB#:  [id number] / [id number]

DD:  04/12/2020 10:40  DT:  04/12/2020 11:48

## 2020-04-13 NOTE — NUR
Pt discharged home with spouse. Discharge instructions and medications discussed with 
patient. Questions answered. Information on follow up appointments discussed with patient. 
PIV removed without complication. All belongings accounted for. No falls or injuries 
reported.

## 2020-04-13 NOTE — PDOC
CARDIO Progress Notes


Date & Time


Date of Service


DATE: 4/13/20 


TIME: 07:49


Time of Evaluation


07:49





Subjective


Notes


No chest pain, SOA, palpitations





Vitals


Vitals





Vital Signs








  Date Time  Temp Pulse Resp B/P (MAP) Pulse Ox O2 Delivery O2 Flow Rate FiO2


 


4/13/20 07:20 98.4 80 18 146/97 (113) 95 Room Air  


 


4/12/20 09:00       2.0 








Weight


Weight [ ]





Input and Output


I.O.











Intake and Output 


 


 4/13/20





 07:00


 


Intake Total 2360 ml


 


Output Total 1500 ml


 


Balance 860 ml


 


 


 


Intake Oral 1260 ml


 


IV Total 1100 ml


 


Output Urine Total 1500 ml


 


# Bowel Movements 1











Laboratory


Labs





Laboratory Tests








Test


 4/12/20


06:13 4/13/20


05:49


 


White Blood Count


 12.5 x10^3/uL


(4.0-11.0) 10.8 x10^3/uL


(4.0-11.0)


 


Red Blood Count


 3.95 x10^6/uL


(4.30-5.70) 3.89 x10^6/uL


(4.30-5.70)


 


Hemoglobin


 12.2 g/dL


(13.0-17.5) 12.0 g/dL


(13.0-17.5)


 


Hematocrit


 36.9 %


(39.0-53.0) 36.7 %


(39.0-53.0)


 


Mean Corpuscular Volume 94 fL ()  94 fL () 


 


Mean Corpuscular Hemoglobin 31 pg (25-35)  31 pg (25-35) 


 


Mean Corpuscular Hemoglobin


Concent 33 g/dL


(31-37) 33 g/dL


(31-37)


 


Red Cell Distribution Width


 14.5 %


(11.5-14.5) 15.0 %


(11.5-14.5)


 


Platelet Count


 68 x10^3/uL


(140-400) 83 x10^3/uL


(140-400)


 


Sodium Level


 143 mmol/L


(136-145) 143 mmol/L


(136-145)


 


Potassium Level


 3.8 mmol/L


(3.5-5.1) 3.6 mmol/L


(3.5-5.1)


 


Chloride Level


 111 mmol/L


() 109 mmol/L


()


 


Carbon Dioxide Level


 25 mmol/L


(21-32) 26 mmol/L


(21-32)


 


Anion Gap 7 (6-14)  8 (6-14) 


 


Blood Urea Nitrogen


 21 mg/dL


(8-26) 17 mg/dL


(8-26)


 


Creatinine


 1.0 mg/dL


(0.7-1.3) 0.9 mg/dL


(0.7-1.3)


 


Estimated GFR


(Cockcroft-Gault) 73.7 


 83.2 





 


BUN/Creatinine Ratio 21 (6-20)  


 


Glucose Level


 106 mg/dL


(70-99) 123 mg/dL


(70-99)


 


Calcium Level


 7.5 mg/dL


(8.5-10.1) 7.7 mg/dL


(8.5-10.1)


 


Total Bilirubin


 0.4 mg/dL


(0.2-1.0) 





 


Aspartate Amino Transf


(AST/SGOT) 33 U/L (15-37) 


 





 


Alanine Aminotransferase


(ALT/SGPT) 42 U/L (16-63) 


 





 


Alkaline Phosphatase


 63 U/L


() 





 


Total Protein


 5.0 g/dL


(6.4-8.2) 





 


Albumin


 2.1 g/dL


(3.4-5.0) 





 


Albumin/Globulin Ratio 0.7 (1.0-1.7)  


 


Thyroid Stimulating Hormone


(TSH) 6.236 uIU/mL


(0.358-3.740) 














Microbiology


Micro





Microbiology


4/9/20 Urine Culture - Final, Complete


         


4/9/20 Urine Culture Result 1 (SILVANO) - Final, Complete


         


4/9/20 Blood Culture - Preliminary, Resulted


         NO GROWTH AFTER 3 DAYS...





Physical Exams


HEENT:  Neck Supple W Full Motion


Chest:  Symmetric


Lungs:  Clear to Auscultation


Heart:  S1S2, RRR


Abdomen:  Soft N/T


Extremities:  No Edema


Neurology:  alert, oriented, follow commands





Assessment


Assessment


1.  UTI s/p prostate biopsy


2.  Lactic acidosis, fevers, sepsis


3.  GABRIELA; improved .


4.  AFIB with RVR; new onset in setting of above. Converted back to SR and has 

been maintaining.


5.  Mild troponin elevation; most probable type II, demand ischemia


6.  Hypothyroidism


7.  Thrombocytopenia 








Recommendations





Continue oral Cardizem for rate control


Outpatient echocardiogram arranged to assess LV systolic function


Event monitor to assess AFIB burden, guide therapy 


No ASA/OAC with thrombocytopenia 


Consider outpatient stress test


Follow up in our office as scheduled.











ALIYA CORTÉS           Apr 13, 2020 07:57

## 2020-04-13 NOTE — DS
DATE OF DISCHARGE:  04/13/2020



HOSPITAL COURSE:  The patient is a 71-year-old  male patient who was

admitted originally through the Emergency Room after he underwent a prostate

biopsy as an outpatient.  The day before admission, the patient woke up with

chills and thought he might have fever and his temperature was actually 101. 

When he arrived to the Emergency Room, ____ chills and runny nose.  He was

breathing faster than normal, but denies any shortness of breath.  He does have

a cough.  His temperature upon arrival to the Emergency Room was 99.  The

patient did take a prophylactic Levaquin before and after the procedure.  He

denies any significant rectal pain, discharge, bleeding.  He has no known

COVID-19 exposure.  Risk factor has been out of the house, going to store few

times.  He was extensively investigated in the Emergency Room.  His chest x-ray

showed no acute radiographic abnormality.  His lab work showed that his white

cell count was normal.  He has thrombocytopenia.  His chemistry showed he has

impaired kidney function.  He has lactic acidosis and urinalysis had too

numerous to count rbc's, as well as 5-10 wbc's and a few bacteria.  Apparently,

the Emergency Room physician spoke with Dr. Griffiths, his urologist who performed a

biopsy and apparently he was concerned that the patient might be developing an

infection from the procedure and therefore we started him on Rocephin and was

admitted initially with sepsis, lactic acidosis likely due to urinary tract

infection.  He was treated aggressively with IV fluid and his kidney function

has steadily improved, in fact his creatinine came down from high of 2.3 down to

0.9 this morning.  His white cell count also steadily came down from 12.6 to

10.8.  He has had thrombocytopenia that has been improving steadily from as low

as 55 and today is 83,000.  His COVID-19 by PCR was negative and so far his

urine culture is negative and blood cultures showed no growth after 4 days.  As

he remained hemodynamically stable, afebrile, and was able to void without

difficulty after we removed his Mandujano catheter, a decision was made to discharge

him home with home health.



PHYSICAL EXAMINATION:

GENERAL:  When I saw him today, he looked well and was clearly in no apparent

respiratory distress.  No pallor, jaundice, cyanosis or thyromegaly.  No jugular

venous distention.  No limb edema.

VITAL SIGNS:  Her heart rate was 75, blood pressure was 167/87, temperature was

98.2, respiratory rate 20, and oxygen saturation was 98% on 2 liters of oxygen.

HEAD, EYES, EARS, NOSE AND THROAT:  Normocephalic, atraumatic.

NECK:  Supple.

HEART:  Showed normal first and second heart sounds.  No gallop or murmur.

CHEST:  Clear to auscultation.  No crepitation or rhonchi.

ABDOMEN:  Distended, soft, nontender.  No guarding or rigidity.  No

organomegaly.  All hernial orifice intact.  Bowel sounds normal.

NEUROLOGIC:  He is awake, alert, responding appropriately.  All cranial nerves

intact.

EXTREMITIES:  He moves extremities without difficulty.  He ambulates without

assistance or assistive devices.



LABORATORY DATA:  This morning showed a white cell count of 10,800, hemoglobin

12, hematocrit 36, MCV 94 and platelet count of 83,000.  Serum sodium was 143,

potassium 3.6, chloride 109, bicarbonate 26, anion gap of 8, BUN 17, creatinine

0.9, estimated GFR was 83 mL per minute.  His glucose was 123, calcium was 7.7.



DISCHARGE MEDICATIONS:  He was discharged home to continue on Augmentin 875 mg

twice a day with food for 7 more days, diltiazem  mg once a day,

tamsulosin for Flomax 0.4 mg at bedtime.  Should continue his albuterol sulfate

for ProAir 2 puffs every 4-6 hours, lansoprazole for Prevacid 30 mg once a day

and testosterone cypionate 200 mg ____ vial takes twice a week on Wednesday and

Saturday.



FINAL DISCHARGE DIAGNOSES:

1.  Sepsis, likely due to urinary tract infection, resolved.

2.  Acute kidney injury, resolved.

3.  Atrial fibrillation with rapid ventricular response.  The patient converted

to sinus rhythm.  He is now on Cardizem  mg once a day.



Other medical problems include benign prostatic hypertrophy, hypogonadism and

gastroesophageal reflux disease.





______________________________

MANI BUSBY MD



DR:  BHANU/semaj  JOB#:  [id number] / [id number]

DD:  04/13/2020 13:24  DT:  04/13/2020 14:25

## 2020-06-23 NOTE — CARD
MR#: [id number]

Account#: [id number]

Accession#: [id number]

Date of Study: 06/23/2020

Ordering Physician: ZAK PEGUERO, 

Referring Physician: ZAK PEGUERO, 

Tech: [facility]





--------------- APPROVED REPORT --------------





EXAM: Two-dimensional and M-mode echocardiogram with Doppler and color Doppler.



Other Information 

Quality : Good

Rhythm : NSR



INDICATION

Atrial Fibrillation



2D DIMENSIONS 

Left Atrium(2D)3.3 (1.6-4.0cm)IVSd1.1 (0.7-1.1cm)

Aortic Root(2D)2.8 (2.0-3.7cm)LVDd4.2 (3.9-5.9cm)

LVOT Diameter2.0 (1.8-2.4cm)PWd0.9 (0.7-1.1cm)

LVDs2.3 (2.5-4.0cm)FS (%) 30.0 %

SV60.0 mlLVEF(%)60.0 (>50%)



Aortic Valve

AoV Peak Peter.174.2cm/sAoV VTI31.8cm

AO Peak GR.12.1mmHgLVOT Peak Peter.161.2cm/s

LVOT  VTI 29.40cmAO Mean GR.6mmHg

SILVER (VMAX)3.93eo6YNV   (VTI)3.00cm2



Mitral Valve

MV E Fmvkzlhj85.0cm/sMV DECEL [id number]

MV A Nxebvhge70.2cm/sE/A  Ratio0.8



Tricuspid Valve

TR P. Mrrdhmct946eu/sRAP HGATMAJK2rdKt

TR Peak Gr.85ztChOJOR68giIe



Pulmonary Vein

S1 Nczftvse74.9cm/sD2 Zribfhtj51.0cm/s



 LEFT VENTRICLE 

The left ventricle is normal size. There is normal left ventricular wall thickness. The left ventricu
lar systolic function is normal and the ejection fraction is within normal range. The Ejection Fracti
on is 60-65%. There is normal LV segmental wall motion. Transmitral Doppler flow pattern is Grade I-a
bnormal relaxation pattern.



 RIGHT VENTRICLE 

The right ventricle is normal size. The right ventricular systolic function is normal.



 ATRIA 

The left atrium size is normal. The right atrium size is normal. The interatrial septum is intact wit
h no evidence for an atrial septal defect or patent foramen ovale as noted on 2-D or Doppler imaging.




 AORTIC VALVE 

The aortic valve is calcified but opens well. Doppler and Color Flow revealed trace aortic regurgitat
ion. There is no significant aortic valvular stenosis.



 MITRAL VALVE 

The mitral valve is calcified but opens well. There is no evidence of mitral valve prolapse. There is
 no mitral valve stenosis. Doppler and Color-flow revealed trace mitral regurgitation.



 TRICUSPID VALVE 

The tricuspid valve is normal in structure and function. Doppler and Color Flow revealed trace tricus
pid regurgitation. The PA pressure was estimated at 30 mmHg. There is no tricuspid valve stenosis.



 PULMONIC VALVE 

The pulmonic valve is not well visualized. Doppler and Color Flow revealed mild pulmonic valvular reg
urgitation. There is no pulmonic valvular stenosis.



 GREAT VESSELS 

The aortic root is normal in size. The ascending aorta is moderately dilated at 4.0 cm. The IVC is no
rmal in size and collapses >50% with inspiration.



 PERICARDIAL EFFUSION 

There is no evidence of significant pericardial effusion.



Critical Notification

Critical Value: No



<Conclusion>

The left ventricular systolic function is normal and the ejection fraction is within normal range. Th
e Ejection Fraction is 60-65%.

There is normal LV segmental wall motion.

The ascending aorta is moderately dilated at 4.0 cm.



Signed by : Zak Peguero, 

Electronically Approved : 06/23/2020 12:10:39

## 2021-05-19 NOTE — RAD
EXAM: Right shoulder, 3 views.



HISTORY: Pain.



COMPARISON: None.



FINDINGS: 3 views of the right shoulder obtained. There is no fracture, dislocation or subluxation. T
here is mild acromioclavicular joint spurring.



IMPRESSION: Mild acromioclavicular joint osteoarthritis.



Electronically signed by: Shayna Wakefield MD (5/19/2021 2:17 PM) [id number]

## 2021-07-24 NOTE — PHYS DOC
Past History


Past Medical History:  Asthma, GERD, Other


Additional Past Medical Histor:  NECK


Past Surgical History:  TURP, Other


Additional Past Surgical Histo:  WRIST


Alcohol Use:  None





General Adult


EDM:


Chief Complaint:  FLANK PAIN





HPI:


HPI:





Patient is a 73-year-old male coming in for right flank pain since yesterday.  

States the pain comes and goes and is currently a 2 out of 10.  Patient denies 

any dysuria but has noticed that he has had some blood in his urine.  Denies any

clots in his urine.  Does not take any blood thinners.  Has a history of 

prostate surgery about 6 months ago.  Denies any history of kidney stones.  

Patient initially thought he strained his back working out, but rest was not 

making any better.  Does not get worse with p.o. intake.  Has had some nausea 

but no vomiting.  States he is having regular bowel movements.





Review of Systems:


Review of Systems:


Constitutional:  Denies fever or chills 


Eyes:  Denies change in visual acuity 


HENT:  Denies nasal congestion or sore throat 


Respiratory:  Denies cough or shortness of breath 


Cardiovascular:  Denies chest pain or edema 


GI:  Denies abdominal pain, nausea, vomiting, bloody stools or diarrhea 


: Denies dysuria 


Musculoskeletal:  Denies back pain or joint pain 


Integument:  Denies rash 


Neurologic:  Denies headache, focal weakness or sensory changes 


Endocrine:  Denies polyuria or polydipsia 


Lymphatic:  Denies swollen glands 


Psychiatric:  Denies depression or anxiety





Current Medications:


Current Meds:





Current Medications








 Medications


  (Trade)  Dose


 Ordered  Sig/[facility]  Start Time


 Stop Time Status Last Admin


Dose Admin


 


 Calcium Gluconate


  (Calcium


 Gluconate)  1,000 mg  1X  ONCE  21 12:15


 21 12:12 DC  














Allergies:


Allergies:





Allergies








Coded Allergies Type Severity Reaction Last Updated Verified


 


  No Known Drug Allergies    21 No











Physical Exam:


PE:





Constitutional: Well developed, well nourished, no acute distress, non-toxic 

appearance. []


HENT: Normocephalic, atraumatic, bilateral external ears normal, oropharynx 

moist, no oral exudates, nose normal. []


Eyes: PERRLA, EOMI, conjunctiva normal, no discharge. [] 


Neck: Normal range of motion, no tenderness, supple, no stridor. [] 


Cardiovascular:Heart rate regular rhythm, no murmur []


Lungs & Thorax:  Bilateral breath sounds clear to auscultation []


Abdomen: Bowel sounds normal, soft, no tenderness, no masses, no pulsatile 

masses. [] 


Skin: Warm, dry, no erythema, no rash. [] 


Back: No tenderness, no CVA tenderness. [] 


Extremities: No tenderness, no cyanosis, no clubbing, ROM intact, no edema. [] 


Neurologic: Alert and oriented X 3, normal motor function, normal sensory 

function, no focal deficits noted. []


Psychologic: Affect normal, judgement normal, mood normal. []





Current Patient Data:


Labs:





                                Laboratory Tests








Test


 21


12:11


 


Urine Collection Type Unknown  


 


Urine Color Red  


 


Urine Clarity Bloody  


 


Urine pH   


 


Urine Specific Gravity   


 


Urine Protein  (NEG-TRACE)  


 


Urine Glucose (UA)  mg/dL (NEG)  


 


Urine Ketones (Stick)  mg/dL (NEG)  


 


Urine Blood  (NEG)  


 


Urine Nitrite  (NEG)  


 


Urine Bilirubin  (NEG)  


 


Urine Urobilinogen Dipstick


 mg/dL (0.2


mg/dL)


 


Urine Leukocyte Esterase  (NEG)  


 


Urine RBC


 Tntc /HPF


(0-2)


 


Urine WBC


 5-10 /HPF


(0-4)


 


Urine Squamous Epithelial


Cells None /LPF  





 


Urine Bacteria


 0 /HPF (0-FEW)





 


Urine Mucus Slight /LPF  








Vital Signs:





                                   Vital Signs








  Date Time  Temp Pulse Resp B/P (MAP) Pulse Ox O2 Delivery O2 Flow Rate FiO2


 


21 11:51 98.2 71 18 161/92 97 Room Air  











EKG:


EKG:


[]





Radiology/Procedures:


Radiology/Procedures:


[facility] [address], [place] [address] [phone]


                                        


                                 IMAGING REPORT





                                     Signed





PATIENT: GURU PUTNAM      ACCOUNT: [id number]     MRN#: [id number]


: 1948           LOCATION: ER              AGE: 73


SEX: M                    EXAM DT: 21         ACCESSION#: [id number]


STATUS: PRE ER            ORD. PHYSICIAN: JOEY ZENG MD


REASON: right flank pain, stone study


PROCEDURE: CT ABDOMEN PELVIS WO CONTRAST





CT scan abdomen and pelvis without contrast 2021





CLINICAL HISTORY: Right flank pain.





TECHNIQUE: Unenhanced, contiguous, 3 mm axial sections were obtained through the

 abdomen and pelvis. 





One or more of the following individualized dose reduction techniques were 

utilized for this study:





1. Automated exposure control.


2. Adjustment of the mA and/or kV according to patient size.


3. Use of iterative reconstruction technique.





FINDINGS: Images through the lung bases demonstrate minimal dependent 

subsegmental atelectasis bilaterally. There is a large hiatal hernia.





A 6.7 cm rounded low-attenuation lesion is seen involving the superior aspect of

 the liver which likely represents a hepatic cyst. The spleen, pancreas and 

adrenal glands are within normal limits.





Patchy areas of increased attenuation are seen within the medullary portions of 

both kidneys consistent with medullary nephrocalcinosis. Nonobstructing calculi 

are seen involving both kidneys which measure 1 to 2 mm in size. Mild dilatation

 of the right intrarenal collecting system is seen. A somewhat oval shaped area 

of increased attenuation is seen within the medial aspect of the right 

intrarenal collecting system which measures 2.4 cm in greatest diameter. This 

may represent a partially calcified renal calculus versus a hematoma. The right 

ureter is mildly dilated throughout its course. Within the distal right ureter 

at the level of the right UVJ a 3 mm distal right ureteral calculus is seen. It 

is causing mild obstruction of the right collecting system. There is no evidence

 of obstruction of the left collecting system.





Mild atherosclerotic calcification abdominal aorta is seen. The abdominal aorta 

tapers normally. The gallbladder is contracted. No free fluid or free air is 

seen within the abdomen. There is no evidence of bowel obstruction. The appendix

 is well-visualized and is within normal limits.





Images of the pelvis demonstrate the urinary bladder to be contracted. 

Calcifications are seen within the pelvis consistent with phleboliths. No free 

fluid is seen.





Minimal S-shaped curvature of the thoracolumbar spine is seen. Degenerative 

changes are seen involving the lower thoracic and throughout the lumbar spine 

along with both hips.





IMPRESSION: 3 mm distal right ureteral calculus is seen at the level of the 

right UVJ. It is causing mild obstruction of the right collecting system.





Electronically signed by: Chau Morin MD (2021 12:38 PM) [id number]














DICTATED AND SIGNED BY:     CHAU MORIN MD


DATE:     21 1232





CC: JOEY ZENG MD; LEESA MARTINES MD ~MTH0 0


[]





Heart Score:


C/O Chest Pain:  No


Risk Factors:


Risk Factors:  DM, Current or recent (<one month) smoker, HTN, HLP, family 

history of CAD, obesity.


Risk Scores:


Score 0 - 3:  2.5% MACE over next 6 weeks - Discharge Home


Score 4 - 6:  20.3% MACE over next 6 weeks - Admit for Clinical Observation


Score 7 - 10:  72.7% MACE over next 6 weeks - Early Invasive Strategies





Course & Med Decision Making:


Course & Med Decision Making


Pertinent Labs and Imaging studies reviewed. (See chart for details)





[]





Dragon Disclaimer:


Dragon Disclaimer:


This electronic medical record was generated, in whole or in part, using a voice

 recognition dictation system.





Departure


Departure:


Impression:  


   Primary Impression:  


   Kidney stone on right side


Disposition:   HOME / SELF CARE / HOMELESS


Condition:  STABLE


Referrals:  


LEESA MARTINES MD (PCP)


Patient Instructions:  Diet for Kidney Stones





Additional Instructions:  


Follow-up with urologist and primary care provider.


Scripts


Tamsulosin Hcl (FLOMAX) 0.4 Mg Cap.er.24h


1 CAP PO DAILY for kidney stone for 10 Days, #10 CAP 0 Refills


   Prov: JOEY ZENG MD         21 


Hydrocodone Bit/Acetaminophen (HYDROCODONE-APAP 5-325  **) 1 Each Tablet


1 TAB PO PRN Q6HRS PRN for PAIN, #10 TAB 0 Refills


   Prov: JOEY ZENG MD         21











JOEY ZENG MD            2021 12:46

## 2021-07-24 NOTE — RAD
CT scan abdomen and pelvis without contrast 7/24/2021



CLINICAL HISTORY: Right flank pain.



TECHNIQUE: Unenhanced, contiguous, 3 mm axial sections were obtained through the abdomen and pelvis. 




One or more of the following individualized dose reduction techniques were utilized for this study:



1. Automated exposure control.

2. Adjustment of the mA and/or kV according to patient size.

3. Use of iterative reconstruction technique.



FINDINGS: Images through the lung bases demonstrate minimal dependent subsegmental atelectasis bilate
rally. There is a large hiatal hernia.



A 6.7 cm rounded low-attenuation lesion is seen involving the superior aspect of the liver which like
ly represents a hepatic cyst. The spleen, pancreas and adrenal glands are within normal limits.



Patchy areas of increased attenuation are seen within the medullary portions of both kidneys consiste
nt with medullary nephrocalcinosis. Nonobstructing calculi are seen involving both kidneys which maurizio
ure 1 to 2 mm in size. Mild dilatation of the right intrarenal collecting system is seen. A somewhat 
oval shaped area of increased attenuation is seen within the medial aspect of the right intrarenal co
llecting system which measures 2.4 cm in greatest diameter. This may represent a partially calcified 
renal calculus versus a hematoma. The right ureter is mildly dilated throughout its course. Within th
e distal right ureter at the level of the right UVJ a 3 mm distal right ureteral calculus is seen. It
 is causing mild obstruction of the right collecting system. There is no evidence of obstruction of t
he left collecting system.



Mild atherosclerotic calcification abdominal aorta is seen. The abdominal aorta tapers normally. The 
gallbladder is contracted. No free fluid or free air is seen within the abdomen. There is no evidence
 of bowel obstruction. The appendix is well-visualized and is within normal limits.



Images of the pelvis demonstrate the urinary bladder to be contracted. Calcifications are seen within
 the pelvis consistent with phleboliths. No free fluid is seen.



Minimal S-shaped curvature of the thoracolumbar spine is seen. Degenerative changes are seen involvin
g the lower thoracic and throughout the lumbar spine along with both hips.



IMPRESSION: 3 mm distal right ureteral calculus is seen at the level of the right UVJ. It is causing 
mild obstruction of the right collecting system.



Electronically signed by: Chau Morin MD (7/24/2021 12:38 PM) [id number]

## 2021-07-26 NOTE — PHYS DOC
Past History


Past Medical History:  Asthma, GERD, Other


Additional Past Medical Histor:  NECK


Past Surgical History:  TURP, Other


Additional Past Surgical Histo:  WRIST


Alcohol Use:  None





General Adult


EDM:


Chief Complaint:  CONSTIPATION





HPI:


HPI:


73-year-old male presents with constipation.  He tells me he has not had a bowel

movement in at least 2 days likely 3.  He was diagnosed recent with a kidney 

stone has been taking narcotic pain medications.  He assumes this is what is 

causing constipation.  He tried stool softeners without any relief.  He is 

having some intermittent generalized abdominal cramping and feeling of gastric 

reflux.  He decided he should come in for evaluation and possible cleanout.  

Patient denies fever or chills.  He has no other specific complaints.





Review of Systems:


Review of Systems:


Constitutional:  Denies fever or chills 


Eyes:  Denies change in visual acuity 


HENT:  Denies nasal congestion or sore throat 


Respiratory:  Denies cough or shortness of breath 


Cardiovascular:  Denies chest pain or edema 


GI: Generalized abdominal cramping, constipation.


: Denies dysuria 


Musculoskeletal:  Denies back pain or joint pain 


Integument:  Denies rash 


Neurologic:  Denies headache, focal weakness or sensory changes 


Endocrine:  Denies polyuria or polydipsia 


Lymphatic:  Denies swollen glands 


Psychiatric:  Denies depression or anxiety





Current Medications:


Current Meds:





Current Medications








 Medications


  (Trade)  Dose


 Ordered  Sig/Rachel  Start Time


 Stop Time Status Last Admin


Dose Admin


 


 Magnesium Citrate


  (Citroma)  296 ml  1X  ONCE  7/26/21 21:30


 7/26/21 21:31 UNV  





 


 Sodium


 Biphosphate/


 Sodium Phosphate


  (Fleet Adult)  133 ml  1X  ONCE  7/26/21 21:30


 7/26/21 21:31 UNV  














Allergies:


Allergies:





Allergies








Coded Allergies Type Severity Reaction Last Updated Verified


 


  No Known Drug Allergies    7/24/21 No











Physical Exam:


PE:





Constitutional: Well developed, well nourished, no acute distress, non-toxic 

appearance. []


HENT: Normocephalic, atraumatic, bilateral external ears normal, oropharynx 

moist, no oral exudates, nose normal. []


Eyes: PERRLA, EOMI, conjunctiva normal, no discharge. [] 


Neck: Normal range of motion, no tenderness, supple, no stridor. [] 


Cardiovascular: Heart rate regular rhythm, no murmur []


Lungs & Thorax:  Bilateral breath sounds clear to auscultation []


Abdomen: Bowel sounds normal, soft, mild generalized tenderness, no masses, no 

pulsatile masses. [] 


Skin: Warm, dry, no erythema, no rash. [] 


Back: No tenderness, no CVA tenderness. [] 


Extremities: No tenderness, no cyanosis, no clubbing, ROM intact, no edema. [] 


Neurologic: Alert and oriented X 3, normal motor function, normal sensory 

function, no focal deficits noted. []


Psychologic: Affect normal, judgement normal, mood normal. []





Current Patient Data:


Vital Signs:





                                   Vital Signs








  Date Time  Temp Pulse Resp B/P (MAP) Pulse Ox O2 Delivery O2 Flow Rate FiO2


 


7/26/21 21:12 97.8  18 173/90    











EKG:


EKG:


[]





Radiology/Procedures:


Radiology/Procedures:


[]


Impressions:


Exam: Abdomen one view





INDICATION: Constipation





TECHNIQUE: Supine view the abdomen





Comparisons: None





FINDINGS:


Large amount of stool noted in the ascending colon.





No suspicious masses or calcifications.





Visualized osseous structures are unremarkable.





IMPRESSION:


Large amount stool noted in the colon





Electronically signed by: David Swenson MD (7/26/2021 9:59 PM) [facility]














DICTATED AND SIGNED BY:     DAVID SWENSON MD


DATE:     07/26/21 2158





CC: REAGAN MORRISSEY DO; LEESA MARTINES MD ~MTH0 0





Heart Score:


C/O Chest Pain:  N/A


Risk Factors:


Risk Factors:  DM, Current or recent (<one month) smoker, HTN, HLP, family 

history of CAD, obesity.


Risk Scores:


Score 0 - 3:  2.5% MACE over next 6 weeks - Discharge Home


Score 4 - 6:  20.3% MACE over next 6 weeks - Admit for Clinical Observation


Score 7 - 10:  72.7% MACE over next 6 weeks - Early Invasive Strategies





Course & Med Decision Making:


Course & Med Decision Making


Pertinent Labs and Imaging studies reviewed. (See chart for details)


The patient's labs are significant for creatinine 1.7.  This is similar to 

previous labs in the chart.  He also has an elevated blood sugar but normal 

anion gap.  We have given a liter normal saline.  His abdominal imaging has 

shown constipation, no other significant acute findings.  We have given him fl

eets enema and magnesium citrate.  He has had some output but not as much as he 

needs to have.  This will likely take some time to move through.  Patient feels 

comfortable with going home.  He will follow-up with his primary doctor as 

needed.  He is stable for discharge at this time.


[]





Dragon Disclaimer:


Dragon Disclaimer:


This electronic medical record was generated, in whole or in part, using a voice

 recognition dictation system.





Departure


Departure:


Impression:  


   Primary Impression:  


   Constipation due to opioid therapy


Disposition:  01 HOME / SELF CARE / HOMELESS


Condition:  IMPROVED


Referrals:  


LEESA MARTINES MD (PCP)


Patient Instructions:  Constipation, Adult, Easy-to-Read











REAGAN MORRISSEY DO                 Jul 26, 2021 21:36

## 2021-07-26 NOTE — RAD
Exam: Abdomen one view



INDICATION: Constipation



TECHNIQUE: Supine view the abdomen



Comparisons: None



FINDINGS:

Large amount of stool noted in the ascending colon.



No suspicious masses or calcifications.



Visualized osseous structures are unremarkable.



IMPRESSION:

Large amount stool noted in the colon



Electronically signed by: David Rico MD (7/26/2021 9:59 PM) [facility]